# Patient Record
Sex: FEMALE | Race: WHITE | NOT HISPANIC OR LATINO | Employment: OTHER | URBAN - METROPOLITAN AREA
[De-identification: names, ages, dates, MRNs, and addresses within clinical notes are randomized per-mention and may not be internally consistent; named-entity substitution may affect disease eponyms.]

---

## 2023-11-22 PROBLEM — R39.9 UTI SYMPTOMS: Status: ACTIVE | Noted: 2023-11-22

## 2023-11-22 PROBLEM — B36.0 TINEA VERSICOLOR: Status: ACTIVE | Noted: 2023-11-22

## 2023-11-22 PROBLEM — G47.00 INSOMNIA: Status: ACTIVE | Noted: 2023-11-22

## 2023-11-22 PROBLEM — N13.30 HYDRONEPHROSIS: Status: ACTIVE | Noted: 2023-11-22

## 2023-11-22 PROBLEM — K13.0 ANGULAR CHEILITIS: Status: ACTIVE | Noted: 2023-11-22

## 2023-11-22 PROBLEM — H65.191 ACUTE OTITIS MEDIA WITH EFFUSION OF RIGHT EAR: Status: ACTIVE | Noted: 2023-11-22

## 2023-11-22 PROBLEM — R53.83 FATIGUE: Status: ACTIVE | Noted: 2023-11-22

## 2023-11-22 PROBLEM — F63.2 KLEPTOMANIA: Status: ACTIVE | Noted: 2023-11-22

## 2023-11-22 PROBLEM — F07.81 POST-CONCUSSION SYNDROME: Status: ACTIVE | Noted: 2023-11-22

## 2023-11-22 PROBLEM — F41.8 DEPRESSION WITH ANXIETY: Status: ACTIVE | Noted: 2023-11-22

## 2023-11-22 PROBLEM — R33.9 RETENTION, URINE: Status: ACTIVE | Noted: 2023-11-22

## 2023-11-22 PROBLEM — N94.9 VAGINAL LUMP: Status: ACTIVE | Noted: 2023-11-22

## 2023-11-22 PROBLEM — E78.5 HYPERLIPIDEMIA: Status: ACTIVE | Noted: 2023-11-22

## 2023-11-22 PROBLEM — N89.8 VAGINAL LESION: Status: ACTIVE | Noted: 2023-11-22

## 2023-11-22 PROBLEM — N81.10 FEMALE CYSTOCELE: Status: ACTIVE | Noted: 2023-11-22

## 2023-11-22 PROBLEM — R42 DIZZINESS: Status: ACTIVE | Noted: 2023-11-22

## 2023-11-22 PROBLEM — R51.9 NONINTRACTABLE EPISODIC HEADACHE: Status: ACTIVE | Noted: 2023-11-22

## 2023-11-22 PROBLEM — K21.9 GASTROESOPHAGEAL REFLUX DISEASE WITHOUT ESOPHAGITIS: Status: ACTIVE | Noted: 2023-11-22

## 2023-11-22 PROBLEM — B37.2 YEAST DERMATITIS: Status: ACTIVE | Noted: 2023-11-22

## 2023-11-22 PROBLEM — E55.9 VITAMIN D DEFICIENCY: Status: ACTIVE | Noted: 2023-11-22

## 2023-11-22 PROBLEM — N95.2 ATROPHIC VAGINITIS: Status: ACTIVE | Noted: 2023-11-22

## 2023-11-22 PROBLEM — F31.9 BIPOLAR DISORDER, UNSPECIFIED (MULTI): Status: ACTIVE | Noted: 2023-11-22

## 2023-11-22 PROBLEM — K61.1 PERIRECTAL ABSCESS: Status: ACTIVE | Noted: 2023-11-22

## 2023-11-22 PROBLEM — R10.9 ABDOMINAL PAIN: Status: ACTIVE | Noted: 2023-11-22

## 2023-11-22 PROBLEM — E78.6 LOW LEVEL OF HIGH DENSITY LIPOPROTEIN (HDL): Status: ACTIVE | Noted: 2023-11-22

## 2023-11-22 PROBLEM — J01.90 ACUTE SINUSITIS: Status: ACTIVE | Noted: 2023-11-22

## 2023-11-22 PROBLEM — R10.9 FLANK PAIN: Status: ACTIVE | Noted: 2023-11-22

## 2023-11-22 PROBLEM — R51.9 HEADACHE: Status: ACTIVE | Noted: 2023-11-22

## 2023-11-22 PROBLEM — F41.9 ANXIETY DISORDER: Status: ACTIVE | Noted: 2023-11-22

## 2023-11-22 PROBLEM — R30.0 DYSURIA: Status: ACTIVE | Noted: 2023-11-22

## 2023-11-22 PROBLEM — N13.5 URETERAL STRICTURE: Status: ACTIVE | Noted: 2023-11-22

## 2023-11-22 PROBLEM — R73.01 IMPAIRED FASTING GLUCOSE: Status: ACTIVE | Noted: 2023-11-22

## 2023-11-22 PROBLEM — R21 RASH OF PERINEUM: Status: ACTIVE | Noted: 2023-11-22

## 2023-11-22 PROBLEM — N84.1 POLYP AT CERVICAL OS: Status: ACTIVE | Noted: 2023-11-22

## 2023-11-22 PROBLEM — R26.81 GAIT INSTABILITY: Status: ACTIVE | Noted: 2023-11-22

## 2023-11-22 PROBLEM — R09.82 POST-NASAL DRAINAGE: Status: ACTIVE | Noted: 2023-11-22

## 2023-11-22 PROBLEM — R39.9 URINARY SYMPTOM OR SIGN: Status: ACTIVE | Noted: 2023-11-22

## 2023-11-22 PROBLEM — R35.0 URINARY FREQUENCY: Status: ACTIVE | Noted: 2023-11-22

## 2023-11-22 PROBLEM — R21 RASH OF VULVA: Status: ACTIVE | Noted: 2023-11-22

## 2023-11-22 PROBLEM — B37.31 YEAST VAGINITIS: Status: ACTIVE | Noted: 2023-11-22

## 2023-11-22 PROBLEM — B37.9 YEAST INFECTION: Status: ACTIVE | Noted: 2023-11-22

## 2023-11-22 PROBLEM — N20.1 URETERAL CALCULI: Status: ACTIVE | Noted: 2023-11-22

## 2023-11-22 PROBLEM — N39.0 ACUTE UTI: Status: ACTIVE | Noted: 2023-11-22

## 2023-11-22 PROBLEM — L30.9 ECZEMA: Status: ACTIVE | Noted: 2023-11-22

## 2023-11-22 PROBLEM — J02.9 SORE THROAT: Status: ACTIVE | Noted: 2023-11-22

## 2023-11-22 PROBLEM — N35.919 URETHRAL STRICTURE: Status: ACTIVE | Noted: 2023-11-22

## 2023-11-22 PROBLEM — G47.33 OSA (OBSTRUCTIVE SLEEP APNEA): Status: ACTIVE | Noted: 2023-11-22

## 2023-11-22 PROBLEM — R29.6 FALLS FREQUENTLY: Status: ACTIVE | Noted: 2023-11-22

## 2023-11-22 RX ORDER — MICONAZOLE NITRATE 4 %
1 CREAM WITH PREFILLED APPLICATOR VAGINAL
COMMUNITY
Start: 2022-07-25 | End: 2023-11-28 | Stop reason: SDUPTHER

## 2023-11-22 RX ORDER — METOPROLOL SUCCINATE 25 MG/1
25 TABLET, EXTENDED RELEASE ORAL
COMMUNITY
Start: 2023-10-06 | End: 2024-04-03

## 2023-11-22 RX ORDER — LORAZEPAM 2 MG/1
4 TABLET ORAL NIGHTLY
COMMUNITY
Start: 2023-10-06 | End: 2024-01-04

## 2023-11-22 RX ORDER — ROSUVASTATIN CALCIUM 10 MG/1
10 TABLET, COATED ORAL DAILY
COMMUNITY

## 2023-11-22 RX ORDER — PAROXETINE HYDROCHLORIDE 20 MG/1
2 TABLET, FILM COATED ORAL DAILY
COMMUNITY
Start: 2014-02-06

## 2023-11-22 RX ORDER — MULTIVITAMIN
1 TABLET ORAL DAILY
COMMUNITY
Start: 2020-02-28

## 2023-11-22 RX ORDER — FLUCONAZOLE 150 MG/1
150 TABLET ORAL ONCE
COMMUNITY
End: 2023-11-28 | Stop reason: SDUPTHER

## 2023-11-28 ENCOUNTER — OFFICE VISIT (OUTPATIENT)
Dept: PRIMARY CARE | Facility: CLINIC | Age: 68
End: 2023-11-28
Payer: MEDICARE

## 2023-11-28 VITALS
TEMPERATURE: 96.7 F | OXYGEN SATURATION: 93 % | WEIGHT: 212 LBS | DIASTOLIC BLOOD PRESSURE: 90 MMHG | HEART RATE: 69 BPM | HEIGHT: 68 IN | BODY MASS INDEX: 32.13 KG/M2 | SYSTOLIC BLOOD PRESSURE: 120 MMHG

## 2023-11-28 DIAGNOSIS — G47.33 OSA (OBSTRUCTIVE SLEEP APNEA): ICD-10-CM

## 2023-11-28 DIAGNOSIS — R39.9 UTI SYMPTOMS: ICD-10-CM

## 2023-11-28 DIAGNOSIS — N39.44 NOCTURNAL ENURESIS: Primary | ICD-10-CM

## 2023-11-28 PROCEDURE — 1159F MED LIST DOCD IN RCRD: CPT | Performed by: STUDENT IN AN ORGANIZED HEALTH CARE EDUCATION/TRAINING PROGRAM

## 2023-11-28 PROCEDURE — 99214 OFFICE O/P EST MOD 30 MIN: CPT | Performed by: STUDENT IN AN ORGANIZED HEALTH CARE EDUCATION/TRAINING PROGRAM

## 2023-11-28 RX ORDER — MICONAZOLE NITRATE 4 %
1 CREAM WITH PREFILLED APPLICATOR VAGINAL SEE ADMIN INSTRUCTIONS
Qty: 15 G | Refills: 1 | Status: SHIPPED | OUTPATIENT
Start: 2023-11-28

## 2023-11-28 RX ORDER — FLUCONAZOLE 150 MG/1
TABLET ORAL
Qty: 2 TABLET | Refills: 0 | Status: SHIPPED | OUTPATIENT
Start: 2023-11-28

## 2023-11-28 ASSESSMENT — PATIENT HEALTH QUESTIONNAIRE - PHQ9
5. POOR APPETITE OR OVEREATING: SEVERAL DAYS
3. TROUBLE FALLING OR STAYING ASLEEP OR SLEEPING TOO MUCH: MORE THAN HALF THE DAYS
SUM OF ALL RESPONSES TO PHQ QUESTIONS 1-9: 17
8. MOVING OR SPEAKING SO SLOWLY THAT OTHER PEOPLE COULD HAVE NOTICED. OR THE OPPOSITE, BEING SO FIGETY OR RESTLESS THAT YOU HAVE BEEN MOVING AROUND A LOT MORE THAN USUAL: NEARLY EVERY DAY
2. FEELING DOWN, DEPRESSED OR HOPELESS: SEVERAL DAYS
7. TROUBLE CONCENTRATING ON THINGS, SUCH AS READING THE NEWSPAPER OR WATCHING TELEVISION: NEARLY EVERY DAY
9. THOUGHTS THAT YOU WOULD BE BETTER OFF DEAD, OR OF HURTING YOURSELF: NEARLY EVERY DAY
1. LITTLE INTEREST OR PLEASURE IN DOING THINGS: NOT AT ALL
SUM OF ALL RESPONSES TO PHQ9 QUESTIONS 1 AND 2: 1
4. FEELING TIRED OR HAVING LITTLE ENERGY: NEARLY EVERY DAY
6. FEELING BAD ABOUT YOURSELF - OR THAT YOU ARE A FAILURE OR HAVE LET YOURSELF OR YOUR FAMILY DOWN: SEVERAL DAYS
10. IF YOU CHECKED OFF ANY PROBLEMS, HOW DIFFICULT HAVE THESE PROBLEMS MADE IT FOR YOU TO DO YOUR WORK, TAKE CARE OF THINGS AT HOME, OR GET ALONG WITH OTHER PEOPLE: NOT DIFFICULT AT ALL

## 2023-11-28 ASSESSMENT — ENCOUNTER SYMPTOMS
LOSS OF SENSATION IN FEET: 0
HEADACHES: 1
OCCASIONAL FEELINGS OF UNSTEADINESS: 0
DEPRESSION: 1

## 2023-11-28 NOTE — PROGRESS NOTES
"Subjective   Patient ID: Eulalia Brasher is a 68 y.o. female who presents for UTI (Patient advises that she get a rash like appearance, She advises that she has low back pain. Patient advises that she has reoccurrence.  ) and Headache (Patient advises that she still has a headache from her concussion a year ago. Patient advises that she had pain the in lower back of her head where her head hit. ).    Uti symptoms  OK during the day  At night, has incontinence  Gets a rash  Also having dysuria  Denies hematuria  Still has occasional headache after hitting back of head a year ago  Has since been evaluated, about a week after the incidence  Was diagnosed with concussion  Radiates down into her neck  Admits to constant suicidal thoughts and a plan- sees a psychologist out of [facility]    Plan  Urogynecology referral  Regarding persistent suicidal thoughts, she states she is going to call her psychiatrist, Dr. Quinonez, to discuss  States she would never commit suicide as she has 4 children and would not put them though that  Offered her help with suicidal thoughts, including walk in at the [facility] center  Will go to the ED if she has worsening suicidal thoughts  Will check urine and culture  Conservative treatments for pain in back of head and neck, likely a trap strain    UTI     Headache          Review of Systems   Neurological:  Positive for headaches.       Objective   /90 (BP Location: Left arm, Patient Position: Sitting, BP Cuff Size: Large adult)   Pulse 69   Temp 35.9 °C (96.7 °F) (Temporal)   Ht 1.727 m (5' 8\")   Wt 96.2 kg (212 lb)   SpO2 93% Comment: RA  BMI 32.23 kg/m²     Physical Exam  Vitals reviewed.   Constitutional:       General: She is not in acute distress.     Appearance: Normal appearance. She is not ill-appearing or toxic-appearing.   HENT:      Head: Atraumatic.      Mouth/Throat:      Mouth: Mucous membranes are moist.      Pharynx: Oropharynx is clear.   Eyes:      Extraocular " Movements: Extraocular movements intact.      Conjunctiva/sclera: Conjunctivae normal.      Pupils: Pupils are equal, round, and reactive to light.   Cardiovascular:      Rate and Rhythm: Normal rate and regular rhythm.      Pulses: Normal pulses.      Heart sounds: Normal heart sounds. No murmur heard.  Pulmonary:      Effort: Pulmonary effort is normal. No respiratory distress.      Breath sounds: Normal breath sounds.   Abdominal:      General: Abdomen is flat.      Palpations: Abdomen is soft.      Tenderness: There is no abdominal tenderness.   Musculoskeletal:      Right lower leg: No edema.      Left lower leg: No edema.   Skin:     General: Skin is warm and dry.      Capillary Refill: Capillary refill takes less than 2 seconds.      Findings: No rash.   Neurological:      General: No focal deficit present.      Mental Status: She is alert.      Cranial Nerves: No cranial nerve deficit.      Gait: Gait normal.   Psychiatric:         Mood and Affect: Mood normal.         Behavior: Behavior normal.         Assessment/Plan   Problem List Items Addressed This Visit             ICD-10-CM    UTI symptoms R39.9    Relevant Medications    fluconazole (Diflucan) 150 mg tablet    miconazole nitrate (Monistat 3) 200 mg/5 gram (4 %) cream    Other Relevant Orders    POCT UA (nonautomated) manually resulted    Referral to Urogynecology    Urine Culture     Other Visit Diagnoses         Codes    Nocturnal enuresis    -  Primary N39.44    Relevant Orders    Referral to Urogynecology

## 2023-11-29 PROCEDURE — 87086 URINE CULTURE/COLONY COUNT: CPT

## 2023-11-30 LAB — BACTERIA UR CULT: NORMAL

## 2024-06-26 ENCOUNTER — APPOINTMENT (OUTPATIENT)
Dept: PRIMARY CARE | Facility: CLINIC | Age: 69
End: 2024-06-26
Payer: COMMERCIAL

## 2024-08-23 ENCOUNTER — APPOINTMENT (OUTPATIENT)
Dept: PRIMARY CARE | Facility: CLINIC | Age: 69
End: 2024-08-23
Payer: MEDICARE

## 2024-09-23 ENCOUNTER — APPOINTMENT (OUTPATIENT)
Dept: PRIMARY CARE | Facility: CLINIC | Age: 69
End: 2024-09-23
Payer: MEDICARE

## 2024-09-23 VITALS
BODY MASS INDEX: 30.83 KG/M2 | HEIGHT: 68 IN | WEIGHT: 203.4 LBS | TEMPERATURE: 98 F | HEART RATE: 93 BPM | OXYGEN SATURATION: 94 % | DIASTOLIC BLOOD PRESSURE: 90 MMHG | SYSTOLIC BLOOD PRESSURE: 142 MMHG

## 2024-09-23 DIAGNOSIS — E55.9 VITAMIN D DEFICIENCY: ICD-10-CM

## 2024-09-23 DIAGNOSIS — E78.2 MIXED HYPERLIPIDEMIA: ICD-10-CM

## 2024-09-23 DIAGNOSIS — L57.0 ACTINIC KERATOSES: ICD-10-CM

## 2024-09-23 DIAGNOSIS — R73.01 IMPAIRED FASTING GLUCOSE: ICD-10-CM

## 2024-09-23 DIAGNOSIS — Z12.31 BREAST CANCER SCREENING BY MAMMOGRAM: ICD-10-CM

## 2024-09-23 DIAGNOSIS — I10 PRIMARY HYPERTENSION: ICD-10-CM

## 2024-09-23 DIAGNOSIS — R26.81 GAIT INSTABILITY: ICD-10-CM

## 2024-09-23 DIAGNOSIS — Z00.00 MEDICARE ANNUAL WELLNESS VISIT, SUBSEQUENT: Primary | ICD-10-CM

## 2024-09-23 DIAGNOSIS — Z00.00 ROUTINE GENERAL MEDICAL EXAMINATION AT HEALTH CARE FACILITY: ICD-10-CM

## 2024-09-23 DIAGNOSIS — F43.21 GRIEF REACTION: ICD-10-CM

## 2024-09-23 PROBLEM — F43.20 GRIEF REACTION: Status: ACTIVE | Noted: 2024-09-23

## 2024-09-23 PROCEDURE — G0439 PPPS, SUBSEQ VISIT: HCPCS | Performed by: FAMILY MEDICINE

## 2024-09-23 PROCEDURE — 1159F MED LIST DOCD IN RCRD: CPT | Performed by: FAMILY MEDICINE

## 2024-09-23 PROCEDURE — 3080F DIAST BP >= 90 MM HG: CPT | Performed by: FAMILY MEDICINE

## 2024-09-23 PROCEDURE — 99214 OFFICE O/P EST MOD 30 MIN: CPT | Performed by: FAMILY MEDICINE

## 2024-09-23 PROCEDURE — 3288F FALL RISK ASSESSMENT DOCD: CPT | Performed by: FAMILY MEDICINE

## 2024-09-23 PROCEDURE — 3077F SYST BP >= 140 MM HG: CPT | Performed by: FAMILY MEDICINE

## 2024-09-23 PROCEDURE — G0444 DEPRESSION SCREEN ANNUAL: HCPCS | Performed by: FAMILY MEDICINE

## 2024-09-23 PROCEDURE — 1170F FXNL STATUS ASSESSED: CPT | Performed by: FAMILY MEDICINE

## 2024-09-23 PROCEDURE — 3008F BODY MASS INDEX DOCD: CPT | Performed by: FAMILY MEDICINE

## 2024-09-23 PROCEDURE — 1124F ACP DISCUSS-NO DSCNMKR DOCD: CPT | Performed by: FAMILY MEDICINE

## 2024-09-23 PROCEDURE — 1036F TOBACCO NON-USER: CPT | Performed by: FAMILY MEDICINE

## 2024-09-23 RX ORDER — METOPROLOL SUCCINATE 25 MG/1
25 TABLET, EXTENDED RELEASE ORAL
Qty: 90 TABLET | Refills: 3 | Status: SHIPPED | OUTPATIENT
Start: 2024-09-23 | End: 2025-09-11

## 2024-09-23 ASSESSMENT — ENCOUNTER SYMPTOMS
DIZZINESS: 1
LOSS OF SENSATION IN FEET: 0
OCCASIONAL FEELINGS OF UNSTEADINESS: 1
DEPRESSION: 1

## 2024-09-23 ASSESSMENT — PATIENT HEALTH QUESTIONNAIRE - PHQ9
9. THOUGHTS THAT YOU WOULD BE BETTER OFF DEAD, OR OF HURTING YOURSELF: SEVERAL DAYS
10. IF YOU CHECKED OFF ANY PROBLEMS, HOW DIFFICULT HAVE THESE PROBLEMS MADE IT FOR YOU TO DO YOUR WORK, TAKE CARE OF THINGS AT HOME, OR GET ALONG WITH OTHER PEOPLE: SOMEWHAT DIFFICULT
3. TROUBLE FALLING OR STAYING ASLEEP OR SLEEPING TOO MUCH: MORE THAN HALF THE DAYS
1. LITTLE INTEREST OR PLEASURE IN DOING THINGS: SEVERAL DAYS
SUM OF ALL RESPONSES TO PHQ9 QUESTIONS 1 AND 2: 2
6. FEELING BAD ABOUT YOURSELF - OR THAT YOU ARE A FAILURE OR HAVE LET YOURSELF OR YOUR FAMILY DOWN: NOT AT ALL
2. FEELING DOWN, DEPRESSED OR HOPELESS: SEVERAL DAYS
5. POOR APPETITE OR OVEREATING: SEVERAL DAYS
8. MOVING OR SPEAKING SO SLOWLY THAT OTHER PEOPLE COULD HAVE NOTICED. OR THE OPPOSITE, BEING SO FIGETY OR RESTLESS THAT YOU HAVE BEEN MOVING AROUND A LOT MORE THAN USUAL: SEVERAL DAYS
4. FEELING TIRED OR HAVING LITTLE ENERGY: SEVERAL DAYS
SUM OF ALL RESPONSES TO PHQ QUESTIONS 1-9: 9
7. TROUBLE CONCENTRATING ON THINGS, SUCH AS READING THE NEWSPAPER OR WATCHING TELEVISION: SEVERAL DAYS

## 2024-09-23 ASSESSMENT — ACTIVITIES OF DAILY LIVING (ADL)
DRESSING: INDEPENDENT
BATHING: INDEPENDENT
GROCERY_SHOPPING: INDEPENDENT
MANAGING_FINANCES: NEEDS ASSISTANCE
TAKING_MEDICATION: INDEPENDENT
DOING_HOUSEWORK: INDEPENDENT

## 2024-09-23 NOTE — PATIENT INSTRUCTIONS

## 2024-09-23 NOTE — PROGRESS NOTES
"Subjective :  Chief Complaint: Eulalia Brasher is an 69 y.o. female here for an annual wellness visit and general medical care and f/u.     HPI:  Here for a wellness visit.  Sister passed away yesterday.  Was ill  Has a brother who is dying.   Had episode a couple months ago when she experienced a \"bang\" in her head.  No further episodes.  No headaches.  Falls:  Feeling off balance.  Had a couple falls.  One fall was in the bathtub.    Sleep:  stress now is interfering with her sleep  Mood:  loss is hard for her.  Has a support network, no suicidal ideation, Depression screening completed using the PHQ-2 screening tool.  See rooming flow sheet for documentation.  5 minutes spent.  Ophthalmology:  needs an appointment  Dental:  no concerns, has implants  Advanced directives:  no paperwork.  Son is POA.          Dizziness    Earache         Review of Systems   HENT:  Positive for ear pain.    Neurological:  Positive for dizziness.     All systems reviewed and negative except for what was mentioned in the HPI    Objective   /90 (BP Location: Left arm, Patient Position: Sitting, BP Cuff Size: Large adult)   Pulse 93   Temp 36.7 °C (98 °F) (Temporal)   Ht 1.727 m (5' 8\")   Wt 92.3 kg (203 lb 6.4 oz)   SpO2 94% Comment: RA  BMI 30.93 kg/m²     Physical Exam  General:  Alert, oriented, no acute distress  Eyes:  Sclerae white, PER, conjunctivae clear  ENT:  No nasal congestion.    Neck: Supple  Respiratory:  Normal breath sounds.  No wheezing, rhonchi nor crackles.  No dyspnea.  Cardiovascular:  S1 and S2 positive.  Regular rate and rhythm.  No gallops.  No murmurs.  Vascular:  No edema.    Skin warm and dry.  Patches of hyperkeratotic skin lesions noted on lower extremities.    CNS:  No gross neurological deficits.     Psychiatric:  Affect is positive and appropriate.  No depression.  No anxiety.    Imaging:  No results found.     Labs reviewed:    Lab Results   Component Value Date    WBC 5.3 02/02/2023    " RBC 4.94 02/02/2023    HGB 14.8 02/02/2023    HCT 45.3 02/02/2023    MCV 92 02/02/2023     02/02/2023    CHOL 213 (H) 02/02/2023    TRIG 126 02/02/2023    HDL 41.3 02/02/2023    ALT 34 02/02/2023    AST 41 (H) 02/02/2023     02/02/2023    K 4.7 02/02/2023     02/02/2023    CREATININE 0.81 02/02/2023    BUN 17 02/02/2023    CO2 27 02/02/2023    TSH 1.30 02/02/2023    INR 1.0 04/09/2021       Past Medical, Surgical, and Family History reviewed and updated in chart.    I have reviewed and reconciled the medication list with the patient today.   Current Outpatient Medications:   •  LORazepam (Ativan) 2 mg tablet, Take 2 tablets (4 mg) by mouth once daily at bedtime., Disp: , Rfl:   •  miconazole nitrate (Monistat 3) 200 mg/5 gram (4 %) cream, Insert 1 Application into the vagina see administration instructions.  APPLY TO AFFECTED AREAS AS DIRECTED., Disp: 15 g, Rfl: 1  •  multivitamin tablet, Take 1 tablet by mouth once daily., Disp: , Rfl:   •  PARoxetine (Paxil) 20 mg tablet, Take 2 tablets (40 mg) by mouth once daily., Disp: , Rfl:   •  rosuvastatin (Crestor) 10 mg tablet, Take 1 tablet (10 mg) by mouth once daily., Disp: , Rfl:   •  fluconazole (Diflucan) 150 mg tablet, Take one tablet today, and if symptoms persist for 72 hours, take second tablet. (Patient not taking: Reported on 9/23/2024), Disp: 2 tablet, Rfl: 0  •  metoprolol succinate XL (Toprol-XL) 25 mg 24 hr tablet, Take 1 tablet (25 mg) by mouth once daily., Disp: 90 tablet, Rfl: 3     List of current healthcare providers:  Patient Care Team:  Le Sahu MD as PCP - General     Assessment/Plan :  Problem List Items Addressed This Visit     Actinic keratoses     Dermatology referral given.         Relevant Orders    Referral to Dermatology    Gait instability     Risk of falls.  Will refer to physical therapy.  Neurology consultation ordered.         Relevant Orders    Referral to Physical Therapy    Referral to Neurology     Grief reaction     Brother terminally ill and sister passed away yesterday.    Has support network.  No suicidal ideation.         Hyperlipidemia     Lab order on chart.         Relevant Orders    Comprehensive Metabolic Panel    Lipid Panel    Impaired fasting glucose     Lab order on chart.         Relevant Orders    Hemoglobin A1C    Medicare annual wellness visit, subsequent - Primary     Age appropriate preventive measures reviewed and discussed.  Diet and exercise recommendations discussed.             Primary hypertension     Elevated blood pressure.    Refill given.  Needs follow up for blood pressure and next lab results.         Relevant Medications    metoprolol succinate XL (Toprol-XL) 25 mg 24 hr tablet    Other Relevant Orders    CBC and Auto Differential    TSH with reflex to Free T4 if abnormal    Vitamin D deficiency     Lab order on chart.         Relevant Orders    Vitamin D 25-Hydroxy,Total (for eval of Vitamin D levels)   Other Visit Diagnoses     Routine general medical examination at health care facility        Relevant Orders    1 Year Follow Up In Primary Care - Wellness Exam    Breast cancer screening by mammogram        Relevant Orders    BI mammo bilateral screening tomosynthesis        The following health maintenance schedule was reviewed with the patient and provided in printed form in the after visit summary:  Health Maintenance   Topic Date Due   • Hepatitis C Screening  Never done   • DTaP/Tdap/Td Vaccines (1 - Tdap) Never done   • RSV Pregnant patients and/or  patients aged 60+ years (1 - 1-dose 60+ series) Never done   • Pneumococcal Vaccine: 65+ Years (1 of 1 - PCV) Never done   • Zoster Vaccines (2 of 2) 02/10/2022   • Mammogram  02/02/2024   • Influenza Vaccine (1) 09/01/2024   • COVID-19 Vaccine (4 - 2023-24 season) 09/01/2024   • Medicare Annual Wellness Visit (AWV)  09/24/2025   • Lipid Panel  02/02/2028   • Colorectal Cancer Screening  04/12/2031   • Bone Density Scan   Completed   • HIB Vaccines  Aged Out   • Hepatitis B Vaccines  Aged Out   • IPV Vaccines  Aged Out   • Hepatitis A Vaccines  Aged Out   • Meningococcal Vaccine  Aged Out   • Rotavirus Vaccines  Aged Out   • HPV Vaccines  Aged Out   • Irritable Bowel Syndrome  Discontinued       Advance Care Planning   No paperwork.        Reviewed lab/testing results with the patient and provided patient education regarding the results.  Continue current medications as listed  Follow up in one year for next wellness visit.     Patient was identified as a fall risk. Risk prevention instructions provided.  Patient was identified as a fall risk. Risk prevention instructions provided.

## 2024-09-23 NOTE — ASSESSMENT & PLAN NOTE
Elevated blood pressure.    Refill given.  Needs follow up for blood pressure and next lab results.

## 2024-09-23 NOTE — ASSESSMENT & PLAN NOTE
Brother terminally ill and sister passed away yesterday.    Has support network.  No suicidal ideation.

## 2024-10-03 ENCOUNTER — HOSPITAL ENCOUNTER (OUTPATIENT)
Dept: RADIOLOGY | Facility: HOSPITAL | Age: 69
Discharge: HOME | End: 2024-10-03
Payer: MEDICARE

## 2024-10-03 ENCOUNTER — LAB (OUTPATIENT)
Dept: LAB | Facility: LAB | Age: 69
End: 2024-10-03
Payer: MEDICARE

## 2024-10-03 VITALS — HEIGHT: 68 IN | BODY MASS INDEX: 25.01 KG/M2 | WEIGHT: 165 LBS

## 2024-10-03 DIAGNOSIS — I10 PRIMARY HYPERTENSION: ICD-10-CM

## 2024-10-03 DIAGNOSIS — R73.01 IMPAIRED FASTING GLUCOSE: ICD-10-CM

## 2024-10-03 DIAGNOSIS — E78.2 MIXED HYPERLIPIDEMIA: ICD-10-CM

## 2024-10-03 DIAGNOSIS — E55.9 VITAMIN D DEFICIENCY: ICD-10-CM

## 2024-10-03 DIAGNOSIS — Z12.31 BREAST CANCER SCREENING BY MAMMOGRAM: ICD-10-CM

## 2024-10-03 LAB
25(OH)D3 SERPL-MCNC: 18 NG/ML (ref 30–100)
ALBUMIN SERPL BCP-MCNC: 4.3 G/DL (ref 3.4–5)
ALP SERPL-CCNC: 54 U/L (ref 33–136)
ALT SERPL W P-5'-P-CCNC: 22 U/L (ref 7–45)
ANION GAP SERPL CALC-SCNC: 12 MMOL/L (ref 10–20)
AST SERPL W P-5'-P-CCNC: 30 U/L (ref 9–39)
BASOPHILS # BLD AUTO: 0.08 X10*3/UL (ref 0–0.1)
BASOPHILS NFR BLD AUTO: 1.5 %
BILIRUB SERPL-MCNC: 0.8 MG/DL (ref 0–1.2)
BUN SERPL-MCNC: 15 MG/DL (ref 6–23)
CALCIUM SERPL-MCNC: 9.3 MG/DL (ref 8.6–10.3)
CHLORIDE SERPL-SCNC: 107 MMOL/L (ref 98–107)
CHOLEST SERPL-MCNC: 209 MG/DL (ref 0–199)
CHOLESTEROL/HDL RATIO: 4.7
CO2 SERPL-SCNC: 28 MMOL/L (ref 21–32)
CREAT SERPL-MCNC: 0.83 MG/DL (ref 0.5–1.05)
EGFRCR SERPLBLD CKD-EPI 2021: 76 ML/MIN/1.73M*2
EOSINOPHIL # BLD AUTO: 0.08 X10*3/UL (ref 0–0.7)
EOSINOPHIL NFR BLD AUTO: 1.5 %
ERYTHROCYTE [DISTWIDTH] IN BLOOD BY AUTOMATED COUNT: 12.8 % (ref 11.5–14.5)
EST. AVERAGE GLUCOSE BLD GHB EST-MCNC: 134 MG/DL
GLUCOSE SERPL-MCNC: 133 MG/DL (ref 74–99)
HBA1C MFR BLD: 6.3 %
HCT VFR BLD AUTO: 47.2 % (ref 36–46)
HDLC SERPL-MCNC: 44.6 MG/DL
HGB BLD-MCNC: 15.2 G/DL (ref 12–16)
IMM GRANULOCYTES # BLD AUTO: 0 X10*3/UL (ref 0–0.7)
IMM GRANULOCYTES NFR BLD AUTO: 0 % (ref 0–0.9)
LDLC SERPL CALC-MCNC: 132 MG/DL
LYMPHOCYTES # BLD AUTO: 1.86 X10*3/UL (ref 1.2–4.8)
LYMPHOCYTES NFR BLD AUTO: 35.8 %
MCH RBC QN AUTO: 29.9 PG (ref 26–34)
MCHC RBC AUTO-ENTMCNC: 32.2 G/DL (ref 32–36)
MCV RBC AUTO: 93 FL (ref 80–100)
MONOCYTES # BLD AUTO: 0.44 X10*3/UL (ref 0.1–1)
MONOCYTES NFR BLD AUTO: 8.5 %
NEUTROPHILS # BLD AUTO: 2.73 X10*3/UL (ref 1.2–7.7)
NEUTROPHILS NFR BLD AUTO: 52.7 %
NON HDL CHOLESTEROL: 164 MG/DL (ref 0–149)
NRBC BLD-RTO: 0 /100 WBCS (ref 0–0)
PLATELET # BLD AUTO: 262 X10*3/UL (ref 150–450)
POTASSIUM SERPL-SCNC: 5 MMOL/L (ref 3.5–5.3)
PROT SERPL-MCNC: 6.7 G/DL (ref 6.4–8.2)
RBC # BLD AUTO: 5.08 X10*6/UL (ref 4–5.2)
SODIUM SERPL-SCNC: 142 MMOL/L (ref 136–145)
TRIGL SERPL-MCNC: 160 MG/DL (ref 0–149)
TSH SERPL-ACNC: 1.02 MIU/L (ref 0.44–3.98)
VLDL: 32 MG/DL (ref 0–40)
WBC # BLD AUTO: 5.2 X10*3/UL (ref 4.4–11.3)

## 2024-10-03 PROCEDURE — 77063 BREAST TOMOSYNTHESIS BI: CPT | Performed by: RADIOLOGY

## 2024-10-03 PROCEDURE — 85025 COMPLETE CBC W/AUTO DIFF WBC: CPT

## 2024-10-03 PROCEDURE — 83036 HEMOGLOBIN GLYCOSYLATED A1C: CPT

## 2024-10-03 PROCEDURE — 77067 SCR MAMMO BI INCL CAD: CPT

## 2024-10-03 PROCEDURE — 84443 ASSAY THYROID STIM HORMONE: CPT

## 2024-10-03 PROCEDURE — 80053 COMPREHEN METABOLIC PANEL: CPT

## 2024-10-03 PROCEDURE — 82306 VITAMIN D 25 HYDROXY: CPT

## 2024-10-03 PROCEDURE — 36415 COLL VENOUS BLD VENIPUNCTURE: CPT

## 2024-10-03 PROCEDURE — 77067 SCR MAMMO BI INCL CAD: CPT | Performed by: RADIOLOGY

## 2024-10-03 PROCEDURE — 80061 LIPID PANEL: CPT

## 2024-10-14 ENCOUNTER — APPOINTMENT (OUTPATIENT)
Dept: RADIOLOGY | Facility: HOSPITAL | Age: 69
End: 2024-10-14
Payer: MEDICARE

## 2024-10-28 ENCOUNTER — TELEPHONE (OUTPATIENT)
Dept: PRIMARY CARE | Facility: CLINIC | Age: 69
End: 2024-10-28
Payer: MEDICARE

## 2024-11-07 ENCOUNTER — LAB (OUTPATIENT)
Dept: LAB | Facility: LAB | Age: 69
End: 2024-11-07
Payer: MEDICARE

## 2024-11-07 ENCOUNTER — APPOINTMENT (OUTPATIENT)
Dept: OBSTETRICS AND GYNECOLOGY | Facility: CLINIC | Age: 69
End: 2024-11-07
Payer: MEDICARE

## 2024-11-07 VITALS — BODY MASS INDEX: 30.26 KG/M2 | DIASTOLIC BLOOD PRESSURE: 82 MMHG | WEIGHT: 199 LBS | SYSTOLIC BLOOD PRESSURE: 136 MMHG

## 2024-11-07 DIAGNOSIS — L90.0 LICHEN SCLEROSUS: ICD-10-CM

## 2024-11-07 DIAGNOSIS — E55.9 VITAMIN D DEFICIENCY, UNSPECIFIED: ICD-10-CM

## 2024-11-07 DIAGNOSIS — E03.9 HYPOTHYROIDISM, UNSPECIFIED: ICD-10-CM

## 2024-11-07 DIAGNOSIS — L29.2 VULVAR ITCHING: Primary | ICD-10-CM

## 2024-11-07 DIAGNOSIS — R73.09 OTHER ABNORMAL GLUCOSE: Primary | ICD-10-CM

## 2024-11-07 DIAGNOSIS — E53.9 VITAMIN B DEFICIENCY, UNSPECIFIED: ICD-10-CM

## 2024-11-07 DIAGNOSIS — C90.00 MULTIPLE MYELOMA NOT HAVING ACHIEVED REMISSION (MULTI): ICD-10-CM

## 2024-11-07 LAB
25(OH)D3 SERPL-MCNC: 21 NG/ML (ref 30–100)
EST. AVERAGE GLUCOSE BLD GHB EST-MCNC: 131 MG/DL
FOLATE SERPL-MCNC: 14.8 NG/ML
HBA1C MFR BLD: 6.2 %
PROT SERPL-MCNC: 6.6 G/DL (ref 6.4–8.2)
TSH SERPL-ACNC: 0.96 MIU/L (ref 0.44–3.98)
VIT B12 SERPL-MCNC: 221 PG/ML (ref 211–911)

## 2024-11-07 PROCEDURE — 36415 COLL VENOUS BLD VENIPUNCTURE: CPT

## 2024-11-07 PROCEDURE — 87529 HSV DNA AMP PROBE: CPT

## 2024-11-07 PROCEDURE — 87491 CHLMYD TRACH DNA AMP PROBE: CPT

## 2024-11-07 PROCEDURE — 84443 ASSAY THYROID STIM HORMONE: CPT

## 2024-11-07 PROCEDURE — 82607 VITAMIN B-12: CPT

## 2024-11-07 PROCEDURE — 1159F MED LIST DOCD IN RCRD: CPT | Performed by: ADVANCED PRACTICE MIDWIFE

## 2024-11-07 PROCEDURE — 87591 N.GONORRHOEAE DNA AMP PROB: CPT

## 2024-11-07 PROCEDURE — 99202 OFFICE O/P NEW SF 15 MIN: CPT | Performed by: ADVANCED PRACTICE MIDWIFE

## 2024-11-07 PROCEDURE — 82746 ASSAY OF FOLIC ACID SERUM: CPT

## 2024-11-07 PROCEDURE — 82306 VITAMIN D 25 HYDROXY: CPT

## 2024-11-07 PROCEDURE — 84165 PROTEIN E-PHORESIS SERUM: CPT | Performed by: SPECIALIST

## 2024-11-07 PROCEDURE — 83036 HEMOGLOBIN GLYCOSYLATED A1C: CPT

## 2024-11-07 PROCEDURE — 87661 TRICHOMONAS VAGINALIS AMPLIF: CPT

## 2024-11-07 PROCEDURE — 87205 SMEAR GRAM STAIN: CPT

## 2024-11-07 PROCEDURE — 3079F DIAST BP 80-89 MM HG: CPT | Performed by: ADVANCED PRACTICE MIDWIFE

## 2024-11-07 PROCEDURE — 84165 PROTEIN E-PHORESIS SERUM: CPT

## 2024-11-07 PROCEDURE — 3075F SYST BP GE 130 - 139MM HG: CPT | Performed by: ADVANCED PRACTICE MIDWIFE

## 2024-11-07 PROCEDURE — 84155 ASSAY OF PROTEIN SERUM: CPT

## 2024-11-07 RX ORDER — CLOBETASOL PROPIONATE 0.5 MG/G
OINTMENT TOPICAL
Qty: 30 G | Refills: 3 | Status: SHIPPED | OUTPATIENT
Start: 2024-11-07

## 2024-11-07 NOTE — PROGRESS NOTES
"GYNECOLOGY PROGRESS NOTE        CC:  Patient here due \"a sore\" on the Left labia. Patient states she has had this for years. It comes and goes. It is not pain. Has not spread. Has been tested for HSV in the past but it was negative. Has not been sexually active in 15 years. Wants all testing done  today. Patient states she uses OTC yeast Rxs and that usually clears the itching but it hasn't recently after using an OTC. She does not use any topical estrogen creams.   Chief Complaint   Patient presents with    New Patient Visit     New patient visit.   Patient has sore on left vulva.           HPI:  Eulalia Brasher is her with a complaints of vaginal itching and a \"sore\" on the labia.   She denies any new sexual partners, recent antibiotics, or new soaps or detergents. .      ROS:  GYN - see HPI        PHYSICAL EXAM:  /82 (BP Location: Left arm, Patient Position: Sitting, BP Cuff Size: Adult)   Wt 90.3 kg (199 lb)   BMI 30.26 kg/m²   GEN:  A&O, NAD  URO:  normal urethra, bladder NT  Physical Exam  Genitourinary:      Genitourinary Comments: Labial tissue is thickened along the top of the Right side of the labia and the along the entire length of the Left labia.       Right Labia: skin changes.      Right Labia: No tenderness.     Left Labia: lesions (small erosions noted on the Left labia) and skin changes.      Left Labia: No tenderness.           Moderate vaginal atrophy present.       LYMPH:  no inguinal lymphadenopathy  PSYCH:  normal affect, non-anxious      IMPRESSION/PLAN:    A: 2 small splits along the Left labia. Thickened tissue noted along the labia appears to be lichens.  Plan: 1. STI off the urine. 2. Vaginal cx. 3. Viral cx. 4. Clobetasol to use daily x 2-4 weeks.       Problem List Items Addressed This Visit    None       DILAN Zuniga-MIRANDA  "

## 2024-11-08 ENCOUNTER — APPOINTMENT (OUTPATIENT)
Dept: PRIMARY CARE | Facility: CLINIC | Age: 69
End: 2024-11-08
Payer: MEDICARE

## 2024-11-08 DIAGNOSIS — E78.2 MIXED HYPERLIPIDEMIA: ICD-10-CM

## 2024-11-08 DIAGNOSIS — Z71.2 ENCOUNTER TO DISCUSS TEST RESULTS: ICD-10-CM

## 2024-11-08 DIAGNOSIS — E55.9 VITAMIN D INSUFFICIENCY: ICD-10-CM

## 2024-11-08 DIAGNOSIS — R73.01 IMPAIRED FASTING GLUCOSE: Primary | ICD-10-CM

## 2024-11-08 LAB
C TRACH RRNA SPEC QL NAA+PROBE: NEGATIVE
HSV1 DNA SKIN QL NAA+PROBE: NOT DETECTED
HSV2 DNA SKIN QL NAA+PROBE: NOT DETECTED
N GONORRHOEA DNA SPEC QL PROBE+SIG AMP: NEGATIVE
T VAGINALIS RRNA SPEC QL NAA+PROBE: NEGATIVE

## 2024-11-08 PROCEDURE — 99442 PR PHYS/QHP TELEPHONE EVALUATION 11-20 MIN: CPT | Performed by: FAMILY MEDICINE

## 2024-11-08 PROCEDURE — 1036F TOBACCO NON-USER: CPT | Performed by: FAMILY MEDICINE

## 2024-11-08 PROCEDURE — 1159F MED LIST DOCD IN RCRD: CPT | Performed by: FAMILY MEDICINE

## 2024-11-08 RX ORDER — AMMONIUM LACTATE 12 G/100G
LOTION TOPICAL
COMMUNITY
Start: 2024-10-15

## 2024-11-08 ASSESSMENT — PATIENT HEALTH QUESTIONNAIRE - PHQ9
SUM OF ALL RESPONSES TO PHQ9 QUESTIONS 1 AND 2: 0
1. LITTLE INTEREST OR PLEASURE IN DOING THINGS: NOT AT ALL
2. FEELING DOWN, DEPRESSED OR HOPELESS: NOT AT ALL

## 2024-11-08 NOTE — PROGRESS NOTES
Subjective   Chief complaint: Eulalia Brasher is a 69 y.o. female who presents for Results (Patient being called today to follow up on lab results that were done on 10/03/2024///Patient recently had more labs and testing done by Dr. Martinez and DILAN MckenzieJONES. Those results are still in process.).    HPI:  Virtual or Telephone Consent    A telephone visit (audio only) between the patient (at the originating site) and the provider (at the distant site) was utilized to provide this telehealth service.   Verbal consent was requested and obtained from Eulalia Brasher on this date, 11/08/24 for a telehealth visit.   Phone visit today to review her recent lab results.    Was seen by neurology recently and had additional vitamin labs drawn.  Is on vitamin D.  Has been watching her diet and reports she has lost some weight.  She is walking each night.    No further problems with nocturia and urinary incontinence.    Saw gynecology and received topical medication.      Social History:  Younger sister passed away about a month ago.  Doing ok with her grief.  Brother gravely ill now.          Objective   There were no vitals taken for this visit.  Physical Exam  Alert, pleasant, no acute distress  Respirations nonlabored, no dyspnea  Mood is appropriate, no depression, no anxiety  Review of Systems   I have reviewed and reconciled the medication list with the patient today.   Current Outpatient Medications:     ammonium lactate (Lac-Hydrin) 12 % lotion, apply to body twice a day after shower, Disp: , Rfl:     clobetasol (Temovate) 0.05 % ointment, Apply thin film to the vulva daily for 2 to 4 weeks then twice a week for maintenance., Disp: 30 g, Rfl: 3    LORazepam (Ativan) 2 mg tablet, Take 2 tablets (4 mg) by mouth once daily at bedtime., Disp: , Rfl:     metoprolol succinate XL (Toprol-XL) 25 mg 24 hr tablet, Take 1 tablet (25 mg) by mouth once daily., Disp: 90 tablet, Rfl: 3    miconazole nitrate (Monistat  3) 200 mg/5 gram (4 %) cream, Insert 1 Application into the vagina see administration instructions.  APPLY TO AFFECTED AREAS AS DIRECTED., Disp: 15 g, Rfl: 1    multivitamin tablet, Take 1 tablet by mouth once daily., Disp: , Rfl:     PARoxetine (Paxil) 20 mg tablet, Take 2 tablets (40 mg) by mouth once daily., Disp: , Rfl:     rosuvastatin (Crestor) 10 mg tablet, Take 1 tablet (10 mg) by mouth once daily., Disp: , Rfl:      Imaging:  No results found.     Labs reviewed:    Lab Results   Component Value Date    WBC 5.2 10/03/2024    HGB 15.2 10/03/2024    HCT 47.2 (H) 10/03/2024     10/03/2024    CHOL 209 (H) 10/03/2024    TRIG 160 (H) 10/03/2024    HDL 44.6 10/03/2024    ALT 22 10/03/2024    AST 30 10/03/2024     10/03/2024    K 5.0 10/03/2024     10/03/2024    CREATININE 0.83 10/03/2024    BUN 15 10/03/2024    CO2 28 10/03/2024    TSH 0.96 11/07/2024    INR 1.0 04/09/2021    HGBA1C 6.2 (H) 11/07/2024       Assessment/Plan   Problem List Items Addressed This Visit       Hyperlipidemia     Continue statin medication.  Diet and exercise recommendations discussed.             Impaired fasting glucose - Primary     Recent A1c is 6.2%.  Patient education discussed about her lab result.  Diet and exercise recommendations discussed.             Vitamin D insufficiency     May double her dose of Vitamin D supplement.            Continue current medications as listed  Follow up in 3 months.

## 2024-11-08 NOTE — ASSESSMENT & PLAN NOTE
Recent A1c is 6.2%.  Patient education discussed about her lab result.  Diet and exercise recommendations discussed.

## 2024-11-10 LAB
CLUE CELLS VAG LPF-#/AREA: NORMAL /[LPF]
NUGENT SCORE: 3
YEAST VAG WET PREP-#/AREA: NORMAL

## 2024-11-11 LAB
ALBUMIN: 4.1 G/DL (ref 3.4–5)
ALPHA 1 GLOBULIN: 0.3 G/DL (ref 0.2–0.6)
ALPHA 2 GLOBULIN: 0.6 G/DL (ref 0.4–1.1)
BETA GLOBULIN: 0.8 G/DL (ref 0.5–1.2)
GAMMA GLOBULIN: 0.8 G/DL (ref 0.5–1.4)
PATH REVIEW-SERUM PROTEIN ELECTROPHORESIS: NORMAL
PROTEIN ELECTROPHORESIS COMMENT: NORMAL

## 2025-02-07 ENCOUNTER — APPOINTMENT (OUTPATIENT)
Dept: PRIMARY CARE | Facility: CLINIC | Age: 70
End: 2025-02-07
Payer: MEDICARE

## 2025-02-07 DIAGNOSIS — J01.00 ACUTE NON-RECURRENT MAXILLARY SINUSITIS: Primary | ICD-10-CM

## 2025-02-07 DIAGNOSIS — B37.31 YEAST VAGINITIS: ICD-10-CM

## 2025-02-07 RX ORDER — FLUCONAZOLE 150 MG/1
TABLET ORAL
Qty: 1 TABLET | Refills: 1 | Status: SHIPPED | OUTPATIENT
Start: 2025-02-07

## 2025-02-07 RX ORDER — AMOXICILLIN 875 MG/1
875 TABLET, FILM COATED ORAL 2 TIMES DAILY
Qty: 20 TABLET | Refills: 0 | Status: SHIPPED | OUTPATIENT
Start: 2025-02-07 | End: 2025-02-17

## 2025-02-07 ASSESSMENT — PATIENT HEALTH QUESTIONNAIRE - PHQ9
2. FEELING DOWN, DEPRESSED OR HOPELESS: NOT AT ALL
SUM OF ALL RESPONSES TO PHQ9 QUESTIONS 1 AND 2: 0
1. LITTLE INTEREST OR PLEASURE IN DOING THINGS: NOT AT ALL

## 2025-02-07 NOTE — PROGRESS NOTES
Subjective   Chief complaint: Eulalia Brasher is a 69 y.o. female who presents for Sinusitis (Patient states that she has a sinus infection and is requesting an antibiotic (states she gets sinus infections a lot). Also states that she might have a yeast infection).    HPI:  Virtual or Telephone Consent    A telephone visit (audio only) between the patient (at the originating site) and the provider (at the distant site) was utilized to provide this telehealth service.   Verbal consent was requested and obtained from Eulalia Brasher on this date, 02/07/25 for a telehealth visit.   Has been having sinus congestion and pain over her cheeks and next to her nose.  Intermittent ear pain bilaterally.  Her gums are sore.  Symptoms similar to sinus infections she has had in the past.  No overt fevers but had felt warm yesterday.  Also gets a yeast infection with antibiotics and is requesting treatment for that.    Has requested other testing for her heart because she wants to be checked before she travels to [place] with her brother who is reportedly dying.  She was informed that she should come in to the office to be evaluated.          Sinusitis        Objective   There were no vitals taken for this visit.  Physical Exam  Alert, no acute distress  Respirations non-labored, no dyspnea  Mood is appropriate.  No overt depression, nor anxiety.  Review of Systems   I have reviewed and reconciled the medication list with the patient today.   Current Outpatient Medications:     ammonium lactate (Lac-Hydrin) 12 % lotion, apply to body twice a day after shower, Disp: , Rfl:     clobetasol (Temovate) 0.05 % ointment, Apply thin film to the vulva daily for 2 to 4 weeks then twice a week for maintenance., Disp: 30 g, Rfl: 3    metoprolol succinate XL (Toprol-XL) 25 mg 24 hr tablet, Take 1 tablet (25 mg) by mouth once daily., Disp: 90 tablet, Rfl: 3    miconazole nitrate (Monistat 3) 200 mg/5 gram (4 %) cream, Insert 1 Application  into the vagina see administration instructions.  APPLY TO AFFECTED AREAS AS DIRECTED., Disp: 15 g, Rfl: 1    multivitamin tablet, Take 1 tablet by mouth once daily., Disp: , Rfl:     PARoxetine (Paxil) 20 mg tablet, Take 2 tablets (40 mg) by mouth once daily., Disp: , Rfl:     rosuvastatin (Crestor) 10 mg tablet, Take 1 tablet (10 mg) by mouth once daily., Disp: , Rfl:     amoxicillin (Amoxil) 875 mg tablet, Take 1 tablet (875 mg) by mouth 2 times a day for 10 days., Disp: 20 tablet, Rfl: 0    fluconazole (Diflucan) 150 mg tablet, May take a second dose after 4-7 days if symptoms persist., Disp: 1 tablet, Rfl: 1    LORazepam (Ativan) 2 mg tablet, Take 2 tablets (4 mg) by mouth once daily at bedtime., Disp: , Rfl:      Imaging:  No results found.     Labs reviewed:    Lab Results   Component Value Date    WBC 5.2 10/03/2024    HGB 15.2 10/03/2024    HCT 47.2 (H) 10/03/2024     10/03/2024    CHOL 209 (H) 10/03/2024    TRIG 160 (H) 10/03/2024    HDL 44.6 10/03/2024    ALT 22 10/03/2024    AST 30 10/03/2024     10/03/2024    K 5.0 10/03/2024     10/03/2024    CREATININE 0.83 10/03/2024    BUN 15 10/03/2024    CO2 28 10/03/2024    TSH 0.96 11/07/2024    INR 1.0 04/09/2021    HGBA1C 6.2 (H) 11/07/2024       Assessment/Plan   Problem List Items Addressed This Visit       Acute sinusitis - Primary     Rx ordered but informed patient that if symptoms do not improve she will need to come in to the office for an evaluation.  Patient voiced understanding and is agreeable to the plan.           Relevant Medications    amoxicillin (Amoxil) 875 mg tablet    Yeast vaginitis     Will treat presumptively.  Rx ordered.         Relevant Medications    fluconazole (Diflucan) 150 mg tablet       Continue current medications as listed  Follow up appointment as scheduled.  Time Spent  Time spent directly with patient, family or caregiver: 7 minutes  Documentation Time: 7 minutes

## 2025-02-07 NOTE — ASSESSMENT & PLAN NOTE
Rx ordered but informed patient that if symptoms do not improve she will need to come in to the office for an evaluation.  Patient voiced understanding and is agreeable to the plan.

## 2025-03-12 ENCOUNTER — APPOINTMENT (OUTPATIENT)
Dept: PRIMARY CARE | Facility: CLINIC | Age: 70
End: 2025-03-12
Payer: MEDICARE

## 2025-03-12 VITALS
WEIGHT: 195 LBS | HEART RATE: 70 BPM | SYSTOLIC BLOOD PRESSURE: 128 MMHG | HEIGHT: 68 IN | TEMPERATURE: 97.6 F | OXYGEN SATURATION: 95 % | DIASTOLIC BLOOD PRESSURE: 84 MMHG | BODY MASS INDEX: 29.55 KG/M2

## 2025-03-12 DIAGNOSIS — E78.2 MIXED HYPERLIPIDEMIA: Primary | ICD-10-CM

## 2025-03-12 DIAGNOSIS — Z71.89 CARDIAC RISK COUNSELING: ICD-10-CM

## 2025-03-12 DIAGNOSIS — F41.9 ANXIETY: ICD-10-CM

## 2025-03-12 DIAGNOSIS — I10 PRIMARY HYPERTENSION: ICD-10-CM

## 2025-03-12 DIAGNOSIS — E55.9 VITAMIN D DEFICIENCY: ICD-10-CM

## 2025-03-12 DIAGNOSIS — F43.21 GRIEF REACTION: ICD-10-CM

## 2025-03-12 PROCEDURE — 3074F SYST BP LT 130 MM HG: CPT | Performed by: FAMILY MEDICINE

## 2025-03-12 PROCEDURE — 1160F RVW MEDS BY RX/DR IN RCRD: CPT | Performed by: FAMILY MEDICINE

## 2025-03-12 PROCEDURE — 3008F BODY MASS INDEX DOCD: CPT | Performed by: FAMILY MEDICINE

## 2025-03-12 PROCEDURE — 1159F MED LIST DOCD IN RCRD: CPT | Performed by: FAMILY MEDICINE

## 2025-03-12 PROCEDURE — 3079F DIAST BP 80-89 MM HG: CPT | Performed by: FAMILY MEDICINE

## 2025-03-12 PROCEDURE — 99214 OFFICE O/P EST MOD 30 MIN: CPT | Performed by: FAMILY MEDICINE

## 2025-03-12 RX ORDER — ROSUVASTATIN CALCIUM 10 MG/1
10 TABLET, COATED ORAL DAILY
Qty: 90 TABLET | Refills: 3 | Status: SHIPPED | OUTPATIENT
Start: 2025-03-12

## 2025-03-12 RX ORDER — PAROXETINE HYDROCHLORIDE 20 MG/1
20 TABLET, FILM COATED ORAL DAILY
Qty: 90 TABLET | Refills: 1 | Status: SHIPPED | OUTPATIENT
Start: 2025-03-12

## 2025-03-12 ASSESSMENT — PATIENT HEALTH QUESTIONNAIRE - PHQ9
1. LITTLE INTEREST OR PLEASURE IN DOING THINGS: NOT AT ALL
SUM OF ALL RESPONSES TO PHQ9 QUESTIONS 1 AND 2: 0
2. FEELING DOWN, DEPRESSED OR HOPELESS: NOT AT ALL

## 2025-03-12 NOTE — PROGRESS NOTES
"Subjective   Chief complaint: Eulalia Brasher is a 70 y.o. female who presents for URI (X 1 week. //Patient advised that she would like to have her lungs checked).    HPI:  Here with concerns about her health.  Her sister, 5 years younger, was doing fine and passed away unexpectedly on September 24, 2025.  She had cancer that seemed to affect her quickly.  This has depressed Eulalia.  Her brother is dying.  She is interested in having her heart checked.  No chest pains, palpitations or shortness of breath usually.  She has been so depressed and anxious that she has spoken to a .  Sees a therapist also.  She does have heart risk factors: high blood pressure, high cholesterol.  Last labs done in October 2024.      URI         Objective   /84 (BP Location: Left arm, Patient Position: Sitting, BP Cuff Size: Large adult)   Pulse 70   Temp 36.4 °C (97.6 °F) (Temporal)   Ht 1.727 m (5' 7.99\")   Wt 88.5 kg (195 lb)   SpO2 95%   BMI 29.66 kg/m²   Physical Exam  General:  Alert, oriented, no acute distress  Eyes:  Sclerae white, PER, conjunctivae clear  ENT:  No nasal congestion.    Neck: Supple  Endocrine:  November 2024 A1c was 6.2%  Respiratory:  Normal breath sounds.  No wheezing, rhonchi nor crackles.  No dyspnea.  Cardiovascular:  S1 and S2 positive.  Regular rate and rhythm.  No gallops.  No murmurs.  Vascular:  Skin warm and dry.  CNS:  Gait within normal limits.    Psychiatric:  Affect is appropriate.  No overt depression.  No anxiety at this time.  No suicidal ideation.    Review of Systems   I have reviewed and reconciled the medication list with the patient today.   Current Outpatient Medications:     ammonium lactate (Lac-Hydrin) 12 % lotion, apply to body twice a day after shower, Disp: , Rfl:     clobetasol (Temovate) 0.05 % ointment, Apply thin film to the vulva daily for 2 to 4 weeks then twice a week for maintenance., Disp: 30 g, Rfl: 3    LORazepam (Ativan) 2 mg tablet, Take 2 tablets " (4 mg) by mouth once daily at bedtime., Disp: , Rfl:     metoprolol succinate XL (Toprol-XL) 25 mg 24 hr tablet, Take 1 tablet (25 mg) by mouth once daily., Disp: 90 tablet, Rfl: 3    miconazole nitrate (Monistat 3) 200 mg/5 gram (4 %) cream, Insert 1 Application into the vagina see administration instructions.  APPLY TO AFFECTED AREAS AS DIRECTED., Disp: 15 g, Rfl: 1    multivitamin tablet, Take 1 tablet by mouth once daily., Disp: , Rfl:     PARoxetine (Paxil) 20 mg tablet, Take 1 tablet (20 mg) by mouth once daily., Disp: 90 tablet, Rfl: 1    rosuvastatin (Crestor) 10 mg tablet, Take 1 tablet (10 mg) by mouth once daily., Disp: 90 tablet, Rfl: 3     Imaging:  No results found.     Labs reviewed:    Lab Results   Component Value Date    WBC 5.2 10/03/2024    HGB 15.2 10/03/2024    HCT 47.2 (H) 10/03/2024     10/03/2024    CHOL 209 (H) 10/03/2024    TRIG 160 (H) 10/03/2024    HDL 44.6 10/03/2024    ALT 22 10/03/2024    AST 30 10/03/2024     10/03/2024    K 5.0 10/03/2024     10/03/2024    CREATININE 0.83 10/03/2024    BUN 15 10/03/2024    CO2 28 10/03/2024    TSH 0.96 11/07/2024    INR 1.0 04/09/2021    HGBA1C 6.2 (H) 11/07/2024       Assessment/Plan   Problem List Items Addressed This Visit       Anxiety    Relevant Orders    Stress Test    Grief reaction    Relevant Medications    PARoxetine (Paxil) 20 mg tablet    Hyperlipidemia - Primary    Relevant Medications    rosuvastatin (Crestor) 10 mg tablet    Other Relevant Orders    Comprehensive Metabolic Panel    Lipid Panel    Stress Test    Primary hypertension     Controlled.  Continue present management.         Relevant Orders    CBC and Auto Differential    TSH with reflex to Free T4 if abnormal    Stress Test    Vitamin D deficiency    Relevant Orders    Vitamin D 25-Hydroxy,Total (for eval of Vitamin D levels)     Other Visit Diagnoses       BMI 29.0-29.9,adult        Cardiac risk counseling                Continue current medications as  listed  Follow up in September for next wellness visit.

## 2025-09-03 ENCOUNTER — TELEPHONE (OUTPATIENT)
Dept: PRIMARY CARE | Facility: CLINIC | Age: 70
End: 2025-09-03
Payer: MEDICARE